# Patient Record
Sex: FEMALE | Race: WHITE | URBAN - METROPOLITAN AREA
[De-identification: names, ages, dates, MRNs, and addresses within clinical notes are randomized per-mention and may not be internally consistent; named-entity substitution may affect disease eponyms.]

---

## 2020-07-13 ENCOUNTER — EMERGENCY (EMERGENCY)
Facility: HOSPITAL | Age: 85
LOS: 0 days | Discharge: HOME | End: 2020-07-13
Attending: EMERGENCY MEDICINE
Payer: MEDICARE

## 2020-07-13 VITALS — DIASTOLIC BLOOD PRESSURE: 102 MMHG | SYSTOLIC BLOOD PRESSURE: 197 MMHG

## 2020-07-13 VITALS — WEIGHT: 130.07 LBS

## 2020-07-13 DIAGNOSIS — M54.5 LOW BACK PAIN: ICD-10-CM

## 2020-07-13 DIAGNOSIS — E78.5 HYPERLIPIDEMIA, UNSPECIFIED: ICD-10-CM
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
## 2020-07-13 PROCEDURE — 72100 X-RAY EXAM L-S SPINE 2/3 VWS: CPT

## 2020-07-13 PROCEDURE — 99283 EMERGENCY DEPT VISIT LOW MDM: CPT

## 2020-07-13 PROCEDURE — 72100 X-RAY EXAM L-S SPINE 2/3 VWS: CPT | Mod: 26

## 2020-07-13 RX ORDER — METHOCARBAMOL 500 MG/1
1 TABLET, FILM COATED ORAL
Qty: 20 | Refills: 0
Start: 2020-07-13 | End: 2020-07-17

## 2020-07-13 RX ORDER — ACETAMINOPHEN 500 MG
2 TABLET ORAL
Qty: 30 | Refills: 0
Start: 2020-07-13 | End: 2020-07-17

## 2020-07-13 RX ORDER — KETOROLAC TROMETHAMINE 30 MG/ML
30 SYRINGE (ML) INJECTION ONCE
Refills: 0 | Status: DISCONTINUED | OUTPATIENT
Start: 2020-07-13 | End: 2020-07-13

## 2020-07-13 RX ADMIN — Medication 30 MILLIGRAM(S): at 12:50

## 2020-07-13 NOTE — ED STATDOCS - PATIENT PORTAL LINK FT
You can access the FollowMyHealth Patient Portal offered by [facility] by registering at the following website: [email or web address]. By joining [employer] [facility] FollowMyHealth portal, you will also be able to view your health information using other applications (apps) compatible with our system.

## 2020-07-13 NOTE — ED ADULT NURSE NOTE - CHPI ED NUR SYMPTOMS NEG
no fatigue/no tingling/no constipation/no difficulty bearing weight/no anorexia/no bladder dysfunction/no bowel dysfunction/no neck tenderness/no motor function loss/no numbness

## 2020-07-13 NOTE — ED STATDOCS - PROGRESS NOTE DETAILS
86 y/o F presents with back pain x 1 week. pt reports low back pain moderate in intensity that started after lifting a table, pain is worse with movement, does not radiate. Denies fever/chills, n/v, urinary retention/incontinence, saddle anesthesias, weakness, or other complaints at this time.   back: Mild tenderness to thoracolumbar spine. No crepitus or step offs. TTP L thoracolumbar paraspinal muscles. LE muscle strength equal B/l. -Federico Franks PA-C Will dc with robaxin, SW given, pt to FU with PMD and ortho spine if no improvement of sx.

## 2020-07-13 NOTE — ED STATDOCS - CARE PROVIDER_API CALL
Sam Tierney  NEUROSURGERY  [address] [place] [address]  Phone: [phone]  Fax: [phone]  Follow Up Time: 7-10 Days

## 2020-07-13 NOTE — ED ADULT NURSE NOTE - OBJECTIVE STATEMENT
Pt comes to the ED complaining of back pain x 8 days. Pt states that she was lifting something last week when she felt  a "pop" in her back. Pt states that she is going to [facility] tomorrow,

## 2020-07-13 NOTE — ED STATDOCS - NSFOLLOWUPINSTRUCTIONS_ED_ALL_ED_FT
Please follow up with primary MD in 2-3 days. Return to ED immediately for any new or concerning symptoms or worsening symptoms     Back Pain    WHAT YOU NEED TO KNOW:    Back pain is common. It can be caused by many conditions, such as arthritis or the breakdown of spinal discs. Your risk for back pain is increased by injuries, lack of activity, or repeated bending and twisting. You may feel sore or stiff on one or both sides of your back. The pain may spread to your buttocks or thighs.    DISCHARGE INSTRUCTIONS:    Return to the emergency department if:     You have pain, numbness, or weakness in one or both legs.      Your pain becomes so severe that you cannot walk.      You cannot control your urine or bowel movements.      You have severe back pain with chest pain.      You have severe back pain, nausea, and vomiting.      You have severe back pain that spreads to your side or genital area.    Contact your healthcare provider if:     You have back pain that does not get better with rest and pain medicine.      You have a fever.      You have pain that worsens when you are on your back or when you rest.      You have pain that worsens when you cough or sneeze.      You lose weight without trying.      You have questions or concerns about your condition or care.    Medicines:     NSAIDs help decrease swelling and pain. This medicine is available with or without a doctor's order. NSAIDs can cause stomach bleeding or kidney problems in certain people. If you take blood thinner medicine, always ask your healthcare provider if NSAIDs are safe for you. Always read the medicine label and follow directions.      Acetaminophen decreases pain and fever. It is available without a doctor's order. Ask how much to take and how often to take it. Follow directions. Read the labels of all other medicines you are using to see if they also contain acetaminophen, or ask your doctor or pharmacist. Acetaminophen can cause liver damage if not taken correctly. Do not use more than 4 grams (4,000 milligrams) total of acetaminophen in one day.       Muscle relaxers help decrease muscle spasms and back pain.      Prescription pain medicine may be given. Ask your healthcare provider how to take this medicine safely. Some prescription pain medicines contain acetaminophen. Do not take other medicines that contain acetaminophen without talking to your healthcare provider. Too much acetaminophen may cause liver damage. Prescription pain medicine may cause constipation. Ask your healthcare provider how to prevent or treat constipation.       Take your medicine as directed. Contact your healthcare provider if you think your medicine is not helping or if you have side effects. Tell him or her if you are allergic to any medicine. Keep a list of the medicines, vitamins, and herbs you take. Include the amounts, and when and why you take them. Bring the list or the pill bottles to follow-up visits. Carry your medicine list with you in case of an emergency.    How to manage your back pain:     Apply ice on your back for 15 to 20 minutes every hour or as directed. Use an ice pack, or put crushed ice in a plastic bag. Cover it with a towel before you apply it to your skin. Ice helps prevent tissue damage and decreases pain.      Apply heat on your back for 20 to 30 minutes every 2 hours for as many days as directed. Heat helps decrease pain and muscle spasms.      Stay active as much as you can without causing more pain. Bed rest could make your back pain worse. Avoid heavy lifting until your pain is gone.      Go to physical therapy as directed. A physical therapist can teach you exercises to help improve movement and strength, and to decrease pain.    Follow up with your healthcare provider in 2 weeks, or as directed: Write down your questions so you remember to ask them during your visits.

## 2020-07-13 NOTE — ED STATDOCS - OBJECTIVE STATEMENT
84 y/o female with a PMHx of HLD presents to the ED c/o back pain. Pt reports she lifted a table 1 week ago and felt a pop in her back. Pt states she called her PCP and was told to come to ED for evaluation. Pt taking Tylenol 1000mg TID for pain. Nonsmoker. No other complaints at this time.

## 2020-07-13 NOTE — ED STATDOCS - MUSCULOSKELETAL, MLM
range of motion is not limited. TTP paraspinous. No midline tenderness. SLR negative b/l. Able to ambulate.

## 2020-08-08 ENCOUNTER — EMERGENCY (EMERGENCY)
Facility: HOSPITAL | Age: 85
LOS: 0 days | Discharge: HOME | End: 2020-08-08
Payer: MEDICARE

## 2020-08-08 VITALS
HEART RATE: 76 BPM | DIASTOLIC BLOOD PRESSURE: 98 MMHG | SYSTOLIC BLOOD PRESSURE: 151 MMHG | OXYGEN SATURATION: 95 % | RESPIRATION RATE: 18 BRPM | TEMPERATURE: 99 F

## 2020-08-08 DIAGNOSIS — Z11.59 ENCOUNTER FOR SCREENING FOR OTHER VIRAL DISEASES: ICD-10-CM

## 2020-08-08 DIAGNOSIS — Z03.818 ENCOUNTER FOR OBSERVATION FOR SUSPECTED EXPOSURE TO OTHER BIOLOGICAL AGENTS RULED OUT: ICD-10-CM

## 2020-08-08 DIAGNOSIS — E78.5 HYPERLIPIDEMIA, UNSPECIFIED: ICD-10-CM

## 2020-08-08 PROCEDURE — U0003: CPT

## 2020-08-08 PROCEDURE — 99283 EMERGENCY DEPT VISIT LOW MDM: CPT

## 2020-08-08 NOTE — ED STATDOCS - CARE PLAN
Principal Discharge DX:	Exposure to viral disease Principal Discharge DX:	Screening for viral disease

## 2020-08-08 NOTE — ED STATDOCS - CLINICAL SUMMARY MEDICAL DECISION MAKING FREE TEXT BOX
patient presents with no complaints, possible COVID exposure.  As patient is nontoxic appearing will test for COVID and d/c.  Quarantine reviewed and return precautions reviewed.

## 2020-08-08 NOTE — ED STATDOCS - PHYSICAL EXAMINATION
Constitutional: NAD AAOx3. Nontoxic, well appearing. Speaking full sentences  w/o distress  Eyes: EOMI, pupils equal  Head: Normocephalic atraumatic  Mouth: no airway obstruction  Cardiac: [id number]   Resp: Lungs CTAB  GI: Abd s/nt/nd  Neuro: motor and sensory intact

## 2020-08-08 NOTE — ED STATDOCS - OBJECTIVE STATEMENT
Pt presents to ED with no complaints. pt denies any chest pain, sob, dyspnea or any other complaints.  pt unsure if exposed to COVID.   Pt concerned for possible COVID-19.   Pt here for testing.

## 2020-08-09 LAB — SARS-COV-2 RNA SPEC QL NAA+PROBE: SIGNIFICANT CHANGE UP

## 2020-12-20 ENCOUNTER — OUTPATIENT (OUTPATIENT)
Dept: OUTPATIENT SERVICES | Facility: HOSPITAL | Age: 85
LOS: 1 days | End: 2020-12-20
Payer: MEDICARE

## 2020-12-20 DIAGNOSIS — Z20.828 CONTACT WITH AND (SUSPECTED) EXPOSURE TO OTHER VIRAL COMMUNICABLE DISEASES: ICD-10-CM

## 2020-12-20 PROCEDURE — U0003: CPT

## 2020-12-21 DIAGNOSIS — Z20.828 CONTACT WITH AND (SUSPECTED) EXPOSURE TO OTHER VIRAL COMMUNICABLE DISEASES: ICD-10-CM

## 2020-12-22 LAB — SARS-COV-2 RNA SPEC QL NAA+PROBE: SIGNIFICANT CHANGE UP

## 2022-06-30 NOTE — ED ADULT TRIAGE NOTE - ESI TRIAGE ACUITY LEVEL, MLM
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 Denies acute vision changes, focal numbness, tingling or weakness, speech changes, CP, palpitations. Admits to periodic SOB that occurs when she talks too much. No exertional SOB or orthopnea. Denies SOB at present. Denies fever, chills, URI symptoms. 89 y/o F w/ Hx of prior CVA in May 2022, HLD, hypothyroidism, headache and vertigo, recently admitted to [facility] for recurrent frontal headache and nausea, found to have hyponatremia, MR showed no acute changes and discharged home on Cymbalta on 6/17, BIBEMS today for generalized head pressure that started at 10pm last night. Not worst ha of life/ thunderclap real. Denies dizziness however states she is unable to stand and walk due to imbalance. Denies acute vision changes, focal numbness, tingling or weakness, speech changes, CP, palpitations. Admits to periodic SOB that occurs when she talks too much. No exertional SOB or orthopnea. Denies SOB at present. Denies fever, chills, URI symptoms.